# Patient Record
Sex: FEMALE | URBAN - METROPOLITAN AREA
[De-identification: names, ages, dates, MRNs, and addresses within clinical notes are randomized per-mention and may not be internally consistent; named-entity substitution may affect disease eponyms.]

---

## 2023-03-08 ENCOUNTER — OFFICE VISIT (OUTPATIENT)
Facility: LOCATION | Age: 63
End: 2023-03-08
Payer: COMMERCIAL

## 2023-03-08 DIAGNOSIS — E11.9 TYPE 2 DIABETES MELLITUS W/O COMPLICATION: Primary | ICD-10-CM

## 2023-03-08 DIAGNOSIS — H16.223 KERATOCONJUNCTIVITIS SICCA, BILATERAL: ICD-10-CM

## 2023-03-08 PROCEDURE — 92004 COMPRE OPH EXAM NEW PT 1/>: CPT

## 2023-03-08 ASSESSMENT — INTRAOCULAR PRESSURE
OD: 17
OS: 16

## 2023-03-08 ASSESSMENT — VISUAL ACUITY
OS: 20/20
OD: 20/20

## 2023-03-08 NOTE — IMPRESSION/PLAN
Impression: Keratoconjunctivitis sicca, bilateral: P85.168. Plan: Pt educated on the chronic nature of condition. Recommended artificial tears QID to prn OU. Instructed patient to notify clinic if no improvement in symptoms. Monitor annually.

## 2023-03-08 NOTE — IMPRESSION/PLAN
Impression: Type 2 diabetes mellitus w/o complication: V24.3. Plan: Pt educated on stable findings. Stressed importance of BS/BP control and continued care with PCP/endocrinologist. Will write letter to PCP/endocrinologist regarding exam findings.  RTC 1-year for annual diabetic eye exam.

## 2024-08-30 ENCOUNTER — OFFICE VISIT (OUTPATIENT)
Facility: LOCATION | Age: 64
End: 2024-08-30
Payer: COMMERCIAL

## 2024-08-30 DIAGNOSIS — E11.9 TYPE 2 DIABETES MELLITUS W/O COMPLICATION: Primary | ICD-10-CM

## 2024-08-30 DIAGNOSIS — H04.123 TEAR FILM INSUFFICIENCY OF BILATERAL LACRIMAL GLANDS: ICD-10-CM

## 2024-08-30 DIAGNOSIS — H25.13 AGE-RELATED NUCLEAR CATARACT, BILATERAL: ICD-10-CM

## 2024-08-30 DIAGNOSIS — H43.813 VITREOUS DEGENERATION, BILATERAL: ICD-10-CM

## 2024-08-30 DIAGNOSIS — H52.4 PRESBYOPIA: ICD-10-CM

## 2024-08-30 PROCEDURE — 92014 COMPRE OPH EXAM EST PT 1/>: CPT

## 2024-08-30 ASSESSMENT — INTRAOCULAR PRESSURE
OS: 16
OD: 15

## 2024-08-30 ASSESSMENT — VISUAL ACUITY
OS: 20/20
OD: 20/25